# Patient Record
Sex: FEMALE | Race: WHITE | NOT HISPANIC OR LATINO | URBAN - METROPOLITAN AREA
[De-identification: names, ages, dates, MRNs, and addresses within clinical notes are randomized per-mention and may not be internally consistent; named-entity substitution may affect disease eponyms.]

---

## 2020-01-08 ENCOUNTER — APPOINTMENT (RX ONLY)
Dept: URBAN - METROPOLITAN AREA CLINIC 162 | Facility: CLINIC | Age: 80
Setting detail: DERMATOLOGY
End: 2020-01-08

## 2020-01-08 DIAGNOSIS — L57.8 OTHER SKIN CHANGES DUE TO CHRONIC EXPOSURE TO NONIONIZING RADIATION: ICD-10-CM

## 2020-01-08 DIAGNOSIS — L82.1 OTHER SEBORRHEIC KERATOSIS: ICD-10-CM

## 2020-01-08 DIAGNOSIS — L72.8 OTHER FOLLICULAR CYSTS OF THE SKIN AND SUBCUTANEOUS TISSUE: ICD-10-CM

## 2020-01-08 DIAGNOSIS — L70.8 OTHER ACNE: ICD-10-CM

## 2020-01-08 DIAGNOSIS — L57.0 ACTINIC KERATOSIS: ICD-10-CM

## 2020-01-08 PROCEDURE — 17003 DESTRUCT PREMALG LES 2-14: CPT

## 2020-01-08 PROCEDURE — 99213 OFFICE O/P EST LOW 20 MIN: CPT | Mod: 25

## 2020-01-08 PROCEDURE — 17000 DESTRUCT PREMALG LESION: CPT

## 2020-01-08 PROCEDURE — ? LIQUID NITROGEN (CM)

## 2020-01-08 PROCEDURE — ? COUNSELING

## 2020-01-08 PROCEDURE — ? OTHER

## 2020-01-08 ASSESSMENT — LOCATION DETAILED DESCRIPTION DERM
LOCATION DETAILED: RIGHT CLAVICULAR NECK
LOCATION DETAILED: RIGHT SUPERIOR CENTRAL BUCCAL CHEEK
LOCATION DETAILED: LEFT INFERIOR UPPER BACK
LOCATION DETAILED: LEFT UPPER CUTANEOUS LIP

## 2020-01-08 ASSESSMENT — LOCATION SIMPLE DESCRIPTION DERM
LOCATION SIMPLE: RIGHT CHEEK
LOCATION SIMPLE: LEFT LIP
LOCATION SIMPLE: RIGHT ANTERIOR NECK
LOCATION SIMPLE: LEFT UPPER BACK

## 2020-01-08 ASSESSMENT — LOCATION ZONE DERM
LOCATION ZONE: FACE
LOCATION ZONE: NECK
LOCATION ZONE: TRUNK
LOCATION ZONE: LIP

## 2020-01-08 NOTE — PROCEDURE: OTHER
Other (Free Text): treated
Detail Level: Detailed
Note Text (......Xxx Chief Complaint.): This diagnosis correlates with the

## 2021-04-23 ENCOUNTER — APPOINTMENT (RX ONLY)
Dept: URBAN - METROPOLITAN AREA CLINIC 162 | Facility: CLINIC | Age: 81
Setting detail: DERMATOLOGY
End: 2021-04-23

## 2021-04-23 DIAGNOSIS — L70.8 OTHER ACNE: ICD-10-CM

## 2021-04-23 DIAGNOSIS — L30.4 ERYTHEMA INTERTRIGO: ICD-10-CM

## 2021-04-23 DIAGNOSIS — L57.0 ACTINIC KERATOSIS: ICD-10-CM

## 2021-04-23 DIAGNOSIS — L82.1 OTHER SEBORRHEIC KERATOSIS: ICD-10-CM

## 2021-04-23 PROCEDURE — 17000 DESTRUCT PREMALG LESION: CPT

## 2021-04-23 PROCEDURE — ? ADDITIONAL NOTES

## 2021-04-23 PROCEDURE — ? COUNSELING

## 2021-04-23 PROCEDURE — ? PRESCRIPTION

## 2021-04-23 PROCEDURE — 99213 OFFICE O/P EST LOW 20 MIN: CPT | Mod: 25

## 2021-04-23 PROCEDURE — ? LIQUID NITROGEN (CM)

## 2021-04-23 RX ORDER — CICLOPIROX OLAMINE 7.7 MG/G
CREAM TOPICAL
Qty: 1 | Refills: 5 | Status: ERX | COMMUNITY
Start: 2021-04-23

## 2021-04-23 RX ORDER — CHLORHEXIDINE GLUCONATE 213 G/1000ML
SOLUTION TOPICAL
Qty: 1 | Refills: 4 | Status: ERX | COMMUNITY
Start: 2021-04-23

## 2021-04-23 RX ADMIN — CICLOPIROX OLAMINE: 7.7 CREAM TOPICAL at 00:00

## 2021-04-23 RX ADMIN — CHLORHEXIDINE GLUCONATE: 213 SOLUTION TOPICAL at 00:00

## 2021-04-23 ASSESSMENT — LOCATION SIMPLE DESCRIPTION DERM
LOCATION SIMPLE: GROIN
LOCATION SIMPLE: LEFT BREAST
LOCATION SIMPLE: RIGHT BREAST
LOCATION SIMPLE: ABDOMEN

## 2021-04-23 ASSESSMENT — LOCATION DETAILED DESCRIPTION DERM
LOCATION DETAILED: PERIUMBILICAL SKIN
LOCATION DETAILED: RIGHT MEDIAL BREAST 5-6:00 REGION
LOCATION DETAILED: LEFT SUPRAPUBIC SKIN
LOCATION DETAILED: LEFT MEDIAL BREAST 7-8:00 REGION

## 2021-04-23 ASSESSMENT — LOCATION ZONE DERM: LOCATION ZONE: TRUNK

## 2021-05-18 ENCOUNTER — APPOINTMENT (RX ONLY)
Dept: URBAN - METROPOLITAN AREA CLINIC 162 | Facility: CLINIC | Age: 81
Setting detail: DERMATOLOGY
End: 2021-05-18

## 2021-05-18 DIAGNOSIS — L70.8 OTHER ACNE: ICD-10-CM

## 2021-05-18 DIAGNOSIS — L30.9 DERMATITIS, UNSPECIFIED: ICD-10-CM

## 2021-05-18 DIAGNOSIS — L30.4 ERYTHEMA INTERTRIGO: ICD-10-CM | Status: IMPROVED

## 2021-05-18 PROCEDURE — 11105 PUNCH BX SKIN EA SEP/ADDL: CPT

## 2021-05-18 PROCEDURE — ? COUNSELING

## 2021-05-18 PROCEDURE — 11104 PUNCH BX SKIN SINGLE LESION: CPT

## 2021-05-18 PROCEDURE — ? BIOPSY BY PUNCH METHOD

## 2021-05-18 PROCEDURE — 99213 OFFICE O/P EST LOW 20 MIN: CPT | Mod: 25

## 2021-05-18 PROCEDURE — ? ADDITIONAL NOTES

## 2021-05-18 ASSESSMENT — LOCATION ZONE DERM
LOCATION ZONE: TRUNK
LOCATION ZONE: ARM
LOCATION ZONE: LEG

## 2021-05-18 ASSESSMENT — LOCATION DETAILED DESCRIPTION DERM
LOCATION DETAILED: RIGHT MEDIAL BREAST 5-6:00 REGION
LOCATION DETAILED: LEFT ANTERIOR SHOULDER
LOCATION DETAILED: LEFT MEDIAL BREAST 7-8:00 REGION
LOCATION DETAILED: PERIUMBILICAL SKIN
LOCATION DETAILED: RIGHT KNEE

## 2021-05-18 ASSESSMENT — LOCATION SIMPLE DESCRIPTION DERM
LOCATION SIMPLE: LEFT SHOULDER
LOCATION SIMPLE: ABDOMEN
LOCATION SIMPLE: RIGHT BREAST
LOCATION SIMPLE: LEFT BREAST
LOCATION SIMPLE: RIGHT KNEE

## 2023-05-22 ENCOUNTER — APPOINTMENT (RX ONLY)
Dept: URBAN - METROPOLITAN AREA CLINIC 162 | Facility: CLINIC | Age: 83
Setting detail: DERMATOLOGY
End: 2023-05-22

## 2023-05-22 DIAGNOSIS — L70.8 OTHER ACNE: ICD-10-CM

## 2023-05-22 DIAGNOSIS — L82.1 OTHER SEBORRHEIC KERATOSIS: ICD-10-CM

## 2023-05-22 PROCEDURE — 99212 OFFICE O/P EST SF 10 MIN: CPT

## 2023-05-22 PROCEDURE — ? COUNSELING

## 2023-05-22 ASSESSMENT — LOCATION SIMPLE DESCRIPTION DERM: LOCATION SIMPLE: GROIN

## 2023-05-22 ASSESSMENT — LOCATION DETAILED DESCRIPTION DERM: LOCATION DETAILED: LEFT SUPRAPUBIC SKIN

## 2023-05-22 ASSESSMENT — LOCATION ZONE DERM: LOCATION ZONE: TRUNK
